# Patient Record
Sex: FEMALE | Race: WHITE
[De-identification: names, ages, dates, MRNs, and addresses within clinical notes are randomized per-mention and may not be internally consistent; named-entity substitution may affect disease eponyms.]

---

## 2018-01-02 ENCOUNTER — HOSPITAL ENCOUNTER (EMERGENCY)
Dept: HOSPITAL 63 - ER | Age: 38
Discharge: ANOTHER HOSPITAL | End: 2018-01-02
Payer: OTHER GOVERNMENT

## 2018-01-02 VITALS — WEIGHT: 140 LBS | BODY MASS INDEX: 23.9 KG/M2 | HEIGHT: 64 IN

## 2018-01-02 VITALS — DIASTOLIC BLOOD PRESSURE: 83 MMHG | SYSTOLIC BLOOD PRESSURE: 127 MMHG

## 2018-01-02 DIAGNOSIS — F32.9: ICD-10-CM

## 2018-01-02 DIAGNOSIS — G95.0: Primary | ICD-10-CM

## 2018-01-02 DIAGNOSIS — R53.1: ICD-10-CM

## 2018-01-02 PROCEDURE — 99285 EMERGENCY DEPT VISIT HI MDM: CPT

## 2018-01-02 NOTE — PHYS DOC
General


Chief Complaint:  LOWER EXT PAIN


Stated Complaint:  LOWER EXTREMITY


Time Seen by MD:  18:16


Source:  patient


Exam Limitations:  no limitations


Problems:  





History of Present Illness


Initial Comments


Patient is a 37-year-old female sent by [facility] to the ED for further evaluation 

of back pain and leg pain.


Patient states that on December 27 while trying to  her dog and put him 

in the car she felt as if she pulled something in her low back. She describes 

pain across her lumbosacral region described as severe worse with certain 

movements but with no leg weakness or other neuro deficits. Approximately 48 

hours ago her back pain resolved and she's had progressively worsening saddle 

anesthesia, severe pain running down the back of both legs left greater than 

right, some left leg weakness and feeling as if it would give out on her, and 

trouble controlling her bowels and bladder. Earlier today she was finally able 

to produce urine, thinking she was done she stood up and began to walk away 

from the toilet. Her spouse pointed out to her that she was not finished and 

was urinating as she walked. She denies other episodes of incontinence.


Patient states that she's had fairly consistent low back pain since an epidural 

for childbirth this past April 3. She says that "they missed and had to stick 3 

times" and she's had pain with low back stiffness at the epidural site since 

that time. 48 hours ago when her new back pain resolved her pain associated 

from her epidural resolved and she felt as if she had no more low back 

stiffness.


She went to [place] for evaluation but was sent to our facility for further 

evaluation.


On my evaluation the patient is standing as it is extremely uncomfortable to 

sit for any period of time. She bears most of her weight on her right leg, she 

has history of depression her only chronic medication is Wellbutrin.


Timing/Duration:  other


Severity:  severe


Modifying Factors:  worse with movement, improves with rest


Associated Symptoms:  weakness, other





Past Medical History


Medical History:  other (depression)


Surgical History:  other (rectocele  repair in 2014)


Psychosocial History:  depression





Social History


Smoker:  non-smoker


Alcohol:  none


Drugs:  none





Review of Systems


Constitutional:  denies chills, denies diaphoresis, denies fever, denies malaise

, denies weakness


Respiratory:  denies cough, denies shortness of breath, denies wheezing


Cardiovascular:  denies chest pain, denies palpitations, denies syncope


Gastrointestinal:  denies abdominal pain, denies nausea, denies vomiting


Musculoskeletal:  see HPI, denies joint swelling, denies neck pain


Psychiatric/Neurological:  see HPI, denies headache





Physical Exam


General Appearance:  WD/WN, mild distress


Ear, Nose, Throat:  hearing grossly normal, normal ENT inspection


Neck:  non-tender, supple


Respiratory:  normal breath sounds, no respiratory distress


Cardiovascular:  normal peripheral pulses, regular rate, rhythm


Back:  no CVA tenderness, no vertebral tenderness


Extremities:  non-tender, normal inspection, no pedal edema, no calf tenderness

, other


Neurologic/Psychiatric:  CNs II-XII nml as tested, alert, normal mood/affect, 

oriented x 3, other (upper extremities are neurovascularly intact, lower 

extremity muscle strength appears to be symmetric, DTRs are present however 

slightly blunted at the left patella and Achilles, decreased sensory posterior 

buttocks as well as upper legs posteriorly and in the groin--saddle distribution

)


Skin:  normal color, warm/dry





Orders, Labs, Meds


I discussed the patient briefly with our on-call neurologist at [facility]. He confirmed that the patient will need MRI evaluation and that this 

could be a surgical matter.





1907: I discussed the patient with on call hospitalist Dr. Mackey [facility]. He agrees to accept the patient for MRI evaluation and 

neurology or neurosurgery consultation.





37-year-old previously healthy female with new low back pain, leg weakness, 

saddle anesthesia, and loss of bowel or bladder control. Syringomyelia highly 

suspected.


Departure


Time of Disposition:  19:28


Disposition:  02 XFER T-[facility]


Diagnosis:  syringomyelia





Additional Instructions:  


EMS transfer to [facility] Dr. mackey is accepting.











JOHN BASS DO Jan 2, 2018 19:29

## 2018-01-05 VITALS
DIASTOLIC BLOOD PRESSURE: 65 MMHG | DIASTOLIC BLOOD PRESSURE: 65 MMHG | SYSTOLIC BLOOD PRESSURE: 108 MMHG | SYSTOLIC BLOOD PRESSURE: 108 MMHG | SYSTOLIC BLOOD PRESSURE: 108 MMHG | DIASTOLIC BLOOD PRESSURE: 65 MMHG | DIASTOLIC BLOOD PRESSURE: 65 MMHG | SYSTOLIC BLOOD PRESSURE: 108 MMHG

## 2021-04-05 ENCOUNTER — HOSPITAL ENCOUNTER (OUTPATIENT)
Dept: HOSPITAL 61 - PNCL | Age: 41
Discharge: HOME | End: 2021-04-05
Attending: ANESTHESIOLOGY
Payer: OTHER GOVERNMENT

## 2021-04-05 DIAGNOSIS — Z88.6: ICD-10-CM

## 2021-04-05 DIAGNOSIS — K21.9: ICD-10-CM

## 2021-04-05 DIAGNOSIS — Z79.899: ICD-10-CM

## 2021-04-05 DIAGNOSIS — M79.605: ICD-10-CM

## 2021-04-05 DIAGNOSIS — F32.9: ICD-10-CM

## 2021-04-05 DIAGNOSIS — F41.9: ICD-10-CM

## 2021-04-05 DIAGNOSIS — M54.5: Primary | ICD-10-CM

## 2021-04-05 DIAGNOSIS — M19.90: ICD-10-CM

## 2021-04-05 DIAGNOSIS — Z98.890: ICD-10-CM

## 2021-04-05 PROCEDURE — G0463 HOSPITAL OUTPT CLINIC VISIT: HCPCS

## 2021-04-05 NOTE — PDOC1
INITIAL PAIN CONSULT


DATE OF SERVICE:


DOS:


DATE: 4/5/21 


TIME: 15:40





CHIEF COMPLAINT:


Chief Complaint:


Low back and left lower extremity pain





HISTORY OF PRESENT ILLNESS:


40-year-old female presents with pain low back left lower extremity radicular 

fashion status post lumbar discectomy on a semiemergent basis 2018.  Patient 

reports that the pain was better for about 6 months after the surgery but over 

the last 3 years the pain is been returning and most specifically about 1 year 

ago after childbirth had significant pain in the low back and left lower 

extremity once again posterior gluteus posterior thigh posterior calf 

intermittent intensity but all across the low back at all times patient reports 

that is worse with walking standing changing positions has difficulty getting up

out of bed has to rollover before sitting up wakes her from sleep least 2-3 

times a night patient reports it currently does not affect her bowel bladder 

control but does affect her ability to walk as she is unable to run or do any 

aggressive exercise because of the pain.  Patient has had physical therapy in 

the past and still doing some stretching and strength exercises daily but 

without significant reduction in pain.  Patient has been trying over-the-counter

analgesic such as Motrin and Tylenol Motrin does help Tylenol does not.  Patient

did have MRI scan lumbar spine dated December 22, 2020 showing L5-S1 to space 

narrowing degenerative endplate changes with posterior disc osteophyte complex 

in the left paracentral location without significant foraminal stenosis po

stoperative changes and posterior disc osteophyte complex.  Patient rates her 

disability rating 0-10 10 being the worst is a 9 with family home 

responsibilities and recreation and occupational activities to with social 

activity self-care life support activities 5 with sexual behavior.





PAST MEDICAL HISTORY:


PMH:


Arthritis, gastroesophageal reflux, depression, previous pregnancies





PREVIOUS SURGERIES:


Past Surgical Hx:


L5-S1 discectomy 2018





CURRENT MEDICATIONS:


Current Meds:





Active Scripts








 Medications  Dose


 Route/Sig


 Max Daily Dose Days Date Category


 


 Acetaminophen 500


 Mg Tablet  2,000 Mg


 PO DAILY


   4/5/21 Reported


 


 Motrin Ib


  (Ibuprofen) 200


 Mg Tablet  800 Mg


 PO Q6H PRN


   4/5/21 Reported


 


 [sunflower


 leichthin]  2 Tab


 BID


   4/5/21 Reported


 


 Nexium Capsule


  (Esomeprazole


 Magnesium) 20 Mg


 Capsule.dr  15 Mg


 PO BID


   4/5/21 Reported


 


 Bupropion Xl


  (Bupropion Hcl)


 300 Mg Tab.er.24h  300 Mg


 PO BID


   4/5/21 Reported











ALLERGIES;


Allergies:  


Coded Allergies:  


     morphine (Verified  Allergy, Intermediate, itching, 1/3/18)





FAMILY HISTORY:


Family Hx:


No major medical problems or conditions that she is aware of





SOCIAL HISTORY:


Social Hx:


Patient is Dr. Alcohol does not smoke not use any illegal illicit recreational 

drugs is  lives with her spouse has 6 children living at [facility]





REVIEW OF SYSTEMS:


ROS:


Positive for those items mentioned in history of present illness, all systems 

are reviewed, otherwise negative ,and are complete full and well-documented on 

patient's chart.





PHYSICAL EXAM:


VS:


Pressure is 131/88 pulse 85 respirations 16 temperature is 97.7 F height is 5 

feet 7 inches weight 159 pounds


PE:


PHYSICAL EXAMINATION:





GENERAL: The patient is awake, alert, oriented, appropriate, very pleasant 

demeanor, patient accompanied by her .


HEENT: Shows normocephalic, atraumatic.  Extraocular movements are intact and 

symmetrical.  Oral cavity: Mucous membranes moist and pink.  Dentition is 

intact.


NECK: Shows anterior throat supple without palpable lymphadenopathy noted.  

Swallow reflex symmetrical.


CHEST: Shows normal on inspection.  Breath sounds are clear bilaterally, no 

rales rhonchi wheezes auscultated.


HEART: Shows S1, S2 clear.  No murmurs auscultated.


ABDOMEN: Soft, nontender, nondistended, obese.  No palpable organomegaly is 

noted.  No rebound or guarding demonstrated.


BACK: Shows spine grossly in the midline.  Normal-appearing cervical lordotic 

curvature.  There is slightly increased thoracic kyphosis, some minor flattening

of the lumbar lordotic curvature, with well-healed midline surgical scar.  

Lumbar paraspinous muscles show symmetrical on inspection, on palpation shows 

some moderate tenderness diffusely throughout the upper, middle and lower 

distribution of the paraspinous muscles bilaterally and also into the lower 

thoracic paraspinous musculature, firm and tender, but without specific trigger 

points, without radiation of pain.  The patient has good rotational motion of 

the lumbar spine, both laterally as well as extension and flexion without 

significant difficulty.  No tenderness over the spinous processes, sacrum or 

sacroiliac regions.


EXTREMITIES: Lower extremities show deep tendon reflexes 2+ in the patellar and 

tendo calcaneus tendons.  Motor exam is 5 on a scale of 5 with right 

dorsiflexion, extension, quadriceps and hamstring flexion and 4/5 on the left.  

Peripheral pulses are 1+ posterior tibial.  No peripheral edema is noted 

bilaterally.  Lower extremities are warm and dry to touch, equal in color and 

appearance.   The patient is able to stand, stand on her toes that significant 

difficulty or loss of balance walks with a slight favoring gait favoring the 

left lower extremity but without any assistive device such as canes or walkers 

to ambulate.


SKIN: Shows warm and dry, good turgor.  No edema.  No sores, rashes or bruising 

throughout.





IMPRESSION:


Impression:


40-year-old female with approximate 3-year history pain low back left lower 

extremity better initially after surgery 2018 now returning in a radicular 

fashion and L5-S1 dermatomal distribution on the left.


MRI scan lumbar spine as noted


History of arthritis


History of depression





Plan: Options were discussed with the patient including conservative medical 

management physical therapies and interventional techniques.  Patient would like

to pursue interventional techniques.  We discussed a lumbar epidural steroid 

injection using descriptions as well as anatomical models to describe the 

procedure.  Patient will wait for preauthorization with her insurance provider 

once this is obtained we will have her return for a translaminar approach L5-S1 

lumbar epidural steroid injection at that time.  In the meantime patient will 

continue with stretching strength exercise we also discussed potential pool 

therapy and she will look into this at her local fitness facility.











MARTIN PURI MD                Apr 5, 2021 15:47

## 2021-04-21 ENCOUNTER — HOSPITAL ENCOUNTER (OUTPATIENT)
Dept: HOSPITAL 61 - PNCL | Age: 41
Discharge: HOME | End: 2021-04-21
Attending: ANESTHESIOLOGY
Payer: OTHER GOVERNMENT

## 2021-04-21 DIAGNOSIS — M51.16: Primary | ICD-10-CM

## 2021-04-21 DIAGNOSIS — Z79.899: ICD-10-CM

## 2021-04-21 DIAGNOSIS — Z88.6: ICD-10-CM

## 2021-04-21 DIAGNOSIS — F32.9: ICD-10-CM

## 2021-04-21 DIAGNOSIS — M96.1: ICD-10-CM

## 2021-04-21 DIAGNOSIS — F41.9: ICD-10-CM

## 2021-04-21 PROCEDURE — 62323 NJX INTERLAMINAR LMBR/SAC: CPT

## 2021-04-21 NOTE — PDOC
Progress Note - Pain Clinic


Date of Service:


DOS:


DATE: 4/21/21 


TIME: 15:43





Diagnosis:


Dx:


Lumbar radiculopathy with lumbar degenerative disc disease and lumbar 

postlaminectomy syndrome





History or Present Illness:


HPI:


40-year-old female returns for follow-up status post initial evaluation 

preauthorization with her insurance provider for lumbar epidural steroid 

injection.  Patient reports still significant pain in the low back and the left 

lower extremity as was previously in the posterior gluteus posterior thigh 

posterior calf patient reports is an 8 on scale 10 is worse over the past week 7

on average 5 its least is a 7 today patient reports no new motor or sensory 

deficits no new bowel or bladder incontinence describes pain is radiating 

constant low back and leg with walking aching and sharp in the back shooting in 

the lower extremity better with sitting or laying down but has been waking him 

sleep fairly frequently over the past week or so.  Patient reports no other 

complaints.





Physical Exam:


VS:


Blood pressure is 114/70 pulse 88 respirations 16 weight is 98.2 F, weight is 1

5 6 pounds


PE:


PHYSICAL EXAMINATION:





GENERAL: The patient is awake, alert, oriented, appropriate, very pleasant 

demeanor


HEENT: Shows normocephalic, atraumatic.  Extraocular movements are intact and 

symmetrical.  


NECK: Shows anterior throat supple without palpable lymphadenopathy noted.  

Swallow reflex symmetrical.


CHEST: Shows normal on inspection.  Breath sounds are clear bilaterally.


HEART: Shows S1, S2 clear.  No murmurs auscultated.


ABDOMEN: Soft, nontender, nondistended.  No palpable organomegaly is noted.  No 

rebound or guarding demonstrated.


BACK: Shows spine grossly in the midline.  Normal-appearing cervical lordotic 

curvature.  There is slightly increased thoracic kyphosis, some minor flattening

of the lumbar lordotic curvature.  Lumbar paraspinous muscles show symmetrical 

on inspection, on palpation shows some moderate tenderness diffusely throughout 

the upper, middle and lower distribution of the paraspinous muscles, without 

specific trigger points, without radiation of pain.  The patient has good 

rotational motion of the lumbar spine, both laterally as well as extension and 

flexion without significant difficulty.  No tenderness over the spinous 

processes, sacrum or sacroiliac regions.


EXTREMITIES: Lower extremities show deep tendon reflexes 2+ in the patellar and 

tendo calcaneus tendons.  Motor exam is 5 on a scale of 5 with right 

dorsiflexion, extension, quadriceps and hamstring flexion and 4/5 on the left.  

Peripheral pulses are 1+ posterior tibial.  No peripheral edema is noted 

bilaterally.  Lower extremities are warm and dry to touch, equal in color and 

appearance. 


SKIN: Shows warm and dry, good turgor.  No edema.  No sores, rashes or bruising 

throughout.





Procedure:


Procedure:


Options were discussed with the patient.  Patient chart was reviewed as part 

medication regimen updated current review of systems updated today as well.  We 

will proceed with a lumbar epidural steroid injection today with fluoroscopic 

guidance.  Risks were discussed including but not limited to: Bleeding, 

infection, possibility of epidural hematoma and subsequent neurological 

compromise, dural puncture, headaches, spinal cord and/or nerve damage, side 

effects of steroid medication, and poor results regarding pain control.  Patient

understands and wished to proceed.  Patient will return to the clinic in 

approximately 2 weeks for follow-up, was counseled as return appointment acti

vity level and side effects to be aware of.





Medication Injected:


Med Injected:


Procedure is lumbar epidural steroid injection under local anesthetic using 

sterile prep and drape at the L5-S1 level using C-arm fluoroscopic guidance in 

both AP and lateral views medications injected is 120 mg Depo-Medrol + 10 mL 

preservative-free normal saline and 2 mL contrast- condition at discharge is 

stable patient tolerated procedure well had no complications.





Condition at Discharge:


Condition at Discharge:


Condition at discharge stable, patient alert the procedure well and had no c

omplications.











MARTIN PURI MD               Apr 21, 2021 15:45

## 2021-04-21 NOTE — PDOC4
PROCEDURE


Procedure


Patient was consented for lumbar epidural steroid injection.  Risks were dis

cussed including but not limited to: Bleeding, infection, possibility of 

epidural hematoma and subsequent neurological compromise, dural puncture, 

headaches, spinal cord and/or nerve damage, side effects of steroid medication, 

and poor results regarding pain control.  Patient understands and wished to 

proceed.


Procedure is lumbar epidural steroid injection under local anesthetic using 

sterile prep and drape at the L5-S1 level using C-arm fluoroscopic guidance in 

both AP and lateral views medications injected is 120 mg Depo-Medrol + 10 mL 

preservative-free normal saline and 2 mL contrast- condition at discharge is 

stable patient tolerated procedure well had no complications.











MARTIN PURI MD               Apr 21, 2021 15:46

## 2021-06-03 ENCOUNTER — HOSPITAL ENCOUNTER (OUTPATIENT)
Dept: HOSPITAL 61 - PNCL | Age: 41
End: 2021-06-03
Attending: ANESTHESIOLOGY
Payer: OTHER GOVERNMENT

## 2021-06-03 DIAGNOSIS — M51.17: Primary | ICD-10-CM

## 2021-06-03 DIAGNOSIS — M96.1: ICD-10-CM

## 2021-06-03 PROCEDURE — 62323 NJX INTERLAMINAR LMBR/SAC: CPT

## 2021-06-03 NOTE — PDOC4
PROCEDURE


Procedure


Patient was consented for lumbar epidural steroid injection.  Risks were dis

cussed including but not limited to: Bleeding, infection, possibility of 

epidural hematoma and subsequent neurological compromise, dural puncture, 

headaches, spinal cord and/or nerve damage, side effects of steroid medication, 

and poor results regarding pain control.  Patient understands and wished to 

proceed.


Procedure is lumbar epidural steroid injection under local anesthetic using 

sterile prep and drape at the L5-S1 level using C-arm fluoroscopic guidance in 

both AP and lateral views medications injected is 120 mg Depo-Medrol +10mL 

preservative-free normal saline and 2 mL contrast- condition at discharge is 

stable patient tolerated procedure well had no complications.











MARTIN PURI MD                Zana 3, 2021 14:59

## 2021-06-03 NOTE — PDOC
Progress Note - Pain Clinic


Date of Service:


DOS:


DATE: 6/3/21 


TIME: 14:56





Diagnosis:


Dx:


Lumbar radiculopathy with lumbar degenerative disease and lumbar postlaminectomy

syndrome





History or Present Illness:


HPI:


40-year-old female returns follow-up status post lumbar epidural steroid 

injection times one-point first 2021.  Patient reports doing very well with 

about 50 to 75% improvement initially now the pain is about 50% overall improved

in the low back left lower extremity patient reports is returning now becoming 

more noticeable in the low back and left leg patient reports is an 8 on scale 10

is worse over the past week 7 on average 3 its least is a 5 today patient 

scribes radiating constant low back with activity standing walking changing 

positions better with sitting or laying down generally does not awaken her from 

sleep at night most nights patient report is aching dull tight in the back and 

shooting and radiating sometimes stabbing in the leg as well patient reports no 

new motor or sensory deficits no new bowel or bladder incontinence or other 

complaints.





Physical Exam:


VS:


Blood pressure is 135/78 pulse 101 respiration 16 temperature 98.1 F height 

65.5 inches weight is 157 pounds


PE:


PHYSICAL EXAMINATION:





GENERAL: The patient is awake, alert, oriented, appropriate, very pleasant 

demeanor


HEENT: Shows normocephalic, atraumatic.  Extraocular movements are intact and 

symmetrical.  Oral cavity: Mucous membranes moist and pink.  Dentition is 

intact.


NECK: Shows anterior throat supple without palpable lymphadenopathy noted.  

Swallow reflex symmetrical.


CHEST: Shows normal on inspection.  Breath sounds are clear bilaterally.


HEART: Shows S1, S2 clear.  No murmurs auscultated.


ABDOMEN: Soft, nontender, nondistended.  No palpable organomegaly is noted.  


BACK: Shows spine grossly in the midline.  Normal-appearing cervical lordotic 

curvature.  There is slightly increased thoracic kyphosis, some minor flattening

of the lumbar lordotic curvature.  Lumbar paraspinous muscles show symmetrical 

on inspection, on palpation shows some moderate tenderness diffusely throughout 

the upper, middle and lower distribution of the paraspinous muscles without 

specific trigger points, without radiation of pain.  The patient has good 

rotational motion of the lumbar spine, both laterally as well as extension and 

flexion without significant difficulty.


EXTREMITIES: Lower extremities show deep tendon reflexes 2+ in the patellar and 

tendo calcaneus tendons.  Motor exam is 5 on a scale of 5 with right 

dorsiflexion, extension, quadriceps and hamstring flexion and 4/5 on the left.  

Peripheral pulses are 1+ posterior tibial.  No peripheral edema is noted 

bilaterally.  Lower extremities are warm and dry to touch, equal in color and 

appearance.  


SKIN: Shows warm and dry, good turgor.  No edema.  No sores, rashes or bruising 

throughout.





Procedure:


Procedure:


Options were discussed with the patient.  Patient chart was reviewed as her 

current medication regimen updated current review of systems updated today as 

well.  We will proceed with a second in the series lumbar epidural steroid 

injection today with fluoroscopic guidance.  Risks were discussed including but 

not limited to: Bleeding, infection, possibility of epidural hematoma and 

subsequent neurological compromise, dural puncture, headaches, spinal cord 

and/or nerve damage, side effects of steroid medication, and poor results 

regarding pain control.  Patient understands and wished to proceed.  Patient 

return to the clinic in approximate 2 weeks for follow-up, was counseled as 

return appointment activity level and side effects to be aware of.





Medication Injected:


Med Injected:


Procedure is lumbar epidural steroid injection under local anesthetic using 

sterile prep and drape at the L5-S1 level using C-arm fluoroscopic guidance in 

both AP and lateral views medications injected is 120 mg Depo-Medrol +10mL 

preservative-free normal saline and 2 mL contrast- condition at discharge is 

stable patient tolerated procedure well had no complications.





Condition at Discharge:


Condition at Discharge:


Condition at discharge is stable, patient tolerated the procedure well and had 

no complications.











MARTIN PURI MD                Zana 3, 2021 14:59

## 2021-08-19 ENCOUNTER — HOSPITAL ENCOUNTER (OUTPATIENT)
Dept: HOSPITAL 61 - PNCL | Age: 41
End: 2021-08-19
Attending: ANESTHESIOLOGY
Payer: OTHER GOVERNMENT

## 2021-08-19 DIAGNOSIS — M96.1: ICD-10-CM

## 2021-08-19 DIAGNOSIS — M51.16: Primary | ICD-10-CM

## 2021-08-19 PROCEDURE — G0463 HOSPITAL OUTPT CLINIC VISIT: HCPCS

## 2021-08-19 PROCEDURE — 99212 OFFICE O/P EST SF 10 MIN: CPT

## 2021-08-19 NOTE — PDOC
Progress Note - Pain Clinic


Date of Service:


DOS:


DATE: 8/19/21 


TIME: 15:28





Diagnosis:


Dx:


Lumbar radiculopathy with lumbar degenerative disease and lumbar postlaminectomy

syndrome





History or Present Illness:


HPI:


40-year-old female returns for follow-up status post lumbar epidural steroid 

injection x2. Patient reports doing much better with 75% improvement and is 

recently joined a fitness facility and is doing much better with some stretching

strength exercises and consistent workout techniques and activity patient repor

ts her pain is a seven on scale 10 is worse over the past week six on average to

its least is a four today patient reports is aching and dull in the back 

radiating constant at times but doing much better she is increase activity 

greater distance walking exercising doing household activities travel with 

greater ease and comfort sleeping better at night patient reports he does not aw

aken her from sleep since her last injection patient reports no bowel or bladder

incontinence or motor deficits.





Physical Exam:


VS:


Blood pressure is 120/59 pulse 85 respirations 18 temperature is 98.1 F weight 

is 159 pounds


PE:


PHYSICAL EXAMINATION:





GENERAL: The patient is awake, alert, oriented, appropriate, very pleasant in 

demeanor.


HEENT: Shows normocephalic, atraumatic.  Extraocular movements are intact and 

symmetrical.  Oral cavity: Mucous membranes moist and pink.  Dentition is 

intact.


NECK: Shows anterior throat supple without palpable lymphadenopathy noted.  

Swallow reflex symmetrical.


CHEST: Shows normal on inspection.  Breath sounds are clear bilaterally, no 

rales rhonchi wheezes auscultated.


HEART: Shows S1, S2 clear.  No murmurs auscultated.


ABDOMEN: Soft, nontender, nondistended.  No palpable organomegaly is noted.


BACK: Shows spine grossly in the midline.  Normal-appearing cervical lordotic 

curvature.  There is slightly increased thoracic kyphosis, some minor flattening

of the lumbar lordotic curvature.  Lumbar paraspinous muscles show symmetrical 

on inspection, on palpation shows some moderate tenderness diffusely throughout 

the upper, middle and lower distribution of the paraspinous muscles, but without

specific trigger points, without radiation of pain.  The patient has good 

rotational motion of the lumbar spine, both laterally as well as extension and 

flexion without significant difficulty. 


EXTREMITIES: Lower extremities show deep tendon reflexes 2+ in the patellar and 

tendo calcaneus tendons.  Motor exam is five on a scale of 5 with right 

dorsiflexion, extension, quadriceps and hamstring flexion and four/5 on the 

left.  Peripheral pulses are 1+ posterior tibial. No peripheral edema is noted 

bilaterally.  Lower extremities are warm and dry to touch, equal in color and 

appearance. 


SKIN: Shows warm and dry, good turgor.  No edema.  No sores, rashes or bruising 

throughout.





Procedure:


Procedure:


Options discussed with the patient. Patient chart reviews her current medication

regimen updated current review of systems updated today as well. We will hold 

further injections at this time as patient doing quite a bit better. Encourage 

patient to continue with stretching strength exercises as well as exercise 

routine as currently. Patient was encouraged to increase activity slowly and 

gradually as tolerated. Patient will return to clinic at this time on an as-

needed basis.





Medication Injected:


Med Injected:


None





Condition at Discharge:


Condition at Discharge:


Condition at discharge is stable.











MARTIN PURI MD               Aug 19, 2021 15:32